# Patient Record
(demographics unavailable — no encounter records)

---

## 2017-08-18 DIAGNOSIS — L70.0 ACNE VULGARIS: ICD-10-CM

## 2017-08-18 NOTE — TELEPHONE ENCOUNTER
Benzaclin      Last Written Prescription Date: 07/01/2016  Last Fill Quantity: 50g,  # refills: 11   Last Office Visit with [facility], [facility] or [facility] prescribing provider: 08/26/2014

## 2017-08-21 RX ORDER — CLINDAMYCIN AND BENZOYL PEROXIDE 10; 50 MG/G; MG/G
GEL TOPICAL
Qty: 50 G | Refills: 0 | Status: SHIPPED | OUTPATIENT
Start: 2017-08-21

## 2025-06-25 NOTE — TELEPHONE ENCOUNTER
UNABLE TO CONTACT AND THE SCRIPT ALREADY PICKED UP AT PHARMACY SO UNABLE TO ATTACH MESSAGE   25-Jun-2025 08:21